# Patient Record
Sex: MALE | Race: WHITE | NOT HISPANIC OR LATINO | Employment: OTHER | URBAN - METROPOLITAN AREA
[De-identification: names, ages, dates, MRNs, and addresses within clinical notes are randomized per-mention and may not be internally consistent; named-entity substitution may affect disease eponyms.]

---

## 2023-04-18 LAB
ALANINE AMINOTRANSFERASE (SGPT) (U/L) IN SER/PLAS: 21 U/L (ref 10–52)
ALBUMIN (G/DL) IN SER/PLAS: 4.4 G/DL (ref 3.4–5)
ALKALINE PHOSPHATASE (U/L) IN SER/PLAS: 111 U/L (ref 33–136)
ANION GAP IN SER/PLAS: 10 MMOL/L (ref 10–20)
ASPARTATE AMINOTRANSFERASE (SGOT) (U/L) IN SER/PLAS: 25 U/L (ref 9–39)
BASOPHILS (10*3/UL) IN BLOOD BY AUTOMATED COUNT: 0.03 X10E9/L (ref 0–0.1)
BASOPHILS/100 LEUKOCYTES IN BLOOD BY AUTOMATED COUNT: 0.5 % (ref 0–2)
BILIRUBIN DIRECT (MG/DL) IN SER/PLAS: 0.2 MG/DL (ref 0–0.3)
BILIRUBIN TOTAL (MG/DL) IN SER/PLAS: 0.9 MG/DL (ref 0–1.2)
CALCIUM (MG/DL) IN SER/PLAS: 9.8 MG/DL (ref 8.6–10.6)
CARBON DIOXIDE, TOTAL (MMOL/L) IN SER/PLAS: 27 MMOL/L (ref 21–32)
CHLORIDE (MMOL/L) IN SER/PLAS: 106 MMOL/L (ref 98–107)
CREATININE (MG/DL) IN SER/PLAS: 1.12 MG/DL (ref 0.5–1.3)
EOSINOPHILS (10*3/UL) IN BLOOD BY AUTOMATED COUNT: 0.11 X10E9/L (ref 0–0.7)
EOSINOPHILS/100 LEUKOCYTES IN BLOOD BY AUTOMATED COUNT: 1.9 % (ref 0–6)
ERYTHROCYTE DISTRIBUTION WIDTH (RATIO) BY AUTOMATED COUNT: 13.3 % (ref 11.5–14.5)
ERYTHROCYTE MEAN CORPUSCULAR HEMOGLOBIN CONCENTRATION (G/DL) BY AUTOMATED: 32.8 G/DL (ref 32–36)
ERYTHROCYTE MEAN CORPUSCULAR VOLUME (FL) BY AUTOMATED COUNT: 93 FL (ref 80–100)
ERYTHROCYTES (10*6/UL) IN BLOOD BY AUTOMATED COUNT: 4.85 X10E12/L (ref 4.5–5.9)
GFR MALE: 73 ML/MIN/1.73M2
GLUCOSE (MG/DL) IN SER/PLAS: 87 MG/DL (ref 74–99)
HEMATOCRIT (%) IN BLOOD BY AUTOMATED COUNT: 45.1 % (ref 41–52)
HEMOGLOBIN (G/DL) IN BLOOD: 14.8 G/DL (ref 13.5–17.5)
IMMATURE GRANULOCYTES/100 LEUKOCYTES IN BLOOD BY AUTOMATED COUNT: 0.2 % (ref 0–0.9)
INR IN PPP BY COAGULATION ASSAY: 1.1 (ref 0.9–1.1)
LEUKOCYTES (10*3/UL) IN BLOOD BY AUTOMATED COUNT: 5.7 X10E9/L (ref 4.4–11.3)
LYMPHOCYTES (10*3/UL) IN BLOOD BY AUTOMATED COUNT: 2.76 X10E9/L (ref 1.2–4.8)
LYMPHOCYTES/100 LEUKOCYTES IN BLOOD BY AUTOMATED COUNT: 48.3 % (ref 13–44)
MONOCYTES (10*3/UL) IN BLOOD BY AUTOMATED COUNT: 0.64 X10E9/L (ref 0.1–1)
MONOCYTES/100 LEUKOCYTES IN BLOOD BY AUTOMATED COUNT: 11.2 % (ref 2–10)
NEUTROPHILS (10*3/UL) IN BLOOD BY AUTOMATED COUNT: 2.17 X10E9/L (ref 1.2–7.7)
NEUTROPHILS/100 LEUKOCYTES IN BLOOD BY AUTOMATED COUNT: 37.9 % (ref 40–80)
NRBC (PER 100 WBCS) BY AUTOMATED COUNT: 0 /100 WBC (ref 0–0)
PLATELETS (10*3/UL) IN BLOOD AUTOMATED COUNT: 130 X10E9/L (ref 150–450)
POTASSIUM (MMOL/L) IN SER/PLAS: 4.9 MMOL/L (ref 3.5–5.3)
PROTEIN TOTAL: 7.7 G/DL (ref 6.4–8.2)
PROTHROMBIN TIME (PT) IN PPP BY COAGULATION ASSAY: 12.2 SEC (ref 9.8–13.4)
SODIUM (MMOL/L) IN SER/PLAS: 138 MMOL/L (ref 136–145)
UREA NITROGEN (MG/DL) IN SER/PLAS: 14 MG/DL (ref 6–23)

## 2024-05-13 PROBLEM — J90 PLEURAL EFFUSION: Status: ACTIVE | Noted: 2022-08-16

## 2024-05-13 PROBLEM — K81.9 CHOLECYSTITIS: Status: ACTIVE | Noted: 2024-05-13

## 2024-05-13 PROBLEM — Z86.0101 H/O ADENOMATOUS POLYP OF COLON: Status: ACTIVE | Noted: 2021-11-19

## 2024-05-13 PROBLEM — R10.9 ABDOMINAL PAIN: Status: ACTIVE | Noted: 2022-08-15

## 2024-05-13 PROBLEM — R10.11 ABDOMINAL PAIN, RIGHT UPPER QUADRANT: Status: ACTIVE | Noted: 2022-08-16

## 2024-05-13 PROBLEM — H26.491 POSTERIOR CAPSULAR OPACIFICATION VISUALLY SIGNIFICANT OF RIGHT EYE: Status: ACTIVE | Noted: 2021-07-22

## 2024-05-13 PROBLEM — R18.8 CIRRHOSIS OF LIVER WITH ASCITES (MULTI): Status: ACTIVE | Noted: 2021-11-19

## 2024-05-13 PROBLEM — R18.8 ABDOMINAL ASCITES: Status: ACTIVE | Noted: 2021-11-18

## 2024-05-13 PROBLEM — K70.30 ALCOHOLIC CIRRHOSIS (MULTI): Status: ACTIVE | Noted: 2022-08-16

## 2024-05-13 PROBLEM — Z86.010 H/O ADENOMATOUS POLYP OF COLON: Status: ACTIVE | Noted: 2021-11-19

## 2024-05-13 PROBLEM — J30.9 ALLERGIC RHINITIS: Status: ACTIVE | Noted: 2024-05-13

## 2024-05-13 PROBLEM — K74.60 CIRRHOSIS OF LIVER WITH ASCITES (MULTI): Status: ACTIVE | Noted: 2021-11-19

## 2024-05-13 PROBLEM — K76.9 LESION OF LIVER: Status: ACTIVE | Noted: 2022-08-16

## 2024-05-13 RX ORDER — OMEPRAZOLE 20 MG/1
CAPSULE, DELAYED RELEASE ORAL
COMMUNITY
Start: 2022-04-29

## 2024-05-13 RX ORDER — FUROSEMIDE 20 MG/1
TABLET ORAL
COMMUNITY
Start: 2022-08-17

## 2024-05-13 RX ORDER — ONDANSETRON 4 MG/1
4 TABLET, ORALLY DISINTEGRATING ORAL
COMMUNITY
Start: 2022-08-17

## 2024-05-13 RX ORDER — KETOROLAC TROMETHAMINE 10 MG/1
1 TABLET, FILM COATED ORAL EVERY 6 HOURS PRN
COMMUNITY
Start: 2022-08-17 | End: 2024-05-14 | Stop reason: WASHOUT

## 2024-05-13 RX ORDER — SPIRONOLACTONE 25 MG/1
25 TABLET ORAL
COMMUNITY
Start: 2022-08-17

## 2024-05-13 RX ORDER — IBUPROFEN 800 MG/1
TABLET ORAL
COMMUNITY
Start: 2022-07-26 | End: 2024-05-14 | Stop reason: WASHOUT

## 2024-05-13 RX ORDER — MULTIVITAMIN
1 TABLET ORAL DAILY
COMMUNITY

## 2024-05-14 ENCOUNTER — OFFICE VISIT (OUTPATIENT)
Dept: GASTROENTEROLOGY | Facility: CLINIC | Age: 64
End: 2024-05-14
Payer: MEDICARE

## 2024-05-14 VITALS
WEIGHT: 154 LBS | HEART RATE: 59 BPM | BODY MASS INDEX: 25.66 KG/M2 | HEIGHT: 65 IN | SYSTOLIC BLOOD PRESSURE: 131 MMHG | OXYGEN SATURATION: 94 % | DIASTOLIC BLOOD PRESSURE: 71 MMHG

## 2024-05-14 DIAGNOSIS — K70.31 ALCOHOLIC CIRRHOSIS OF LIVER WITH ASCITES (MULTI): Primary | ICD-10-CM

## 2024-05-14 DIAGNOSIS — R10.11 ABDOMINAL PAIN, RIGHT UPPER QUADRANT: ICD-10-CM

## 2024-05-14 DIAGNOSIS — K74.60 CIRRHOSIS OF LIVER WITH ASCITES, UNSPECIFIED HEPATIC CIRRHOSIS TYPE (MULTI): ICD-10-CM

## 2024-05-14 DIAGNOSIS — R18.8 CIRRHOSIS OF LIVER WITH ASCITES, UNSPECIFIED HEPATIC CIRRHOSIS TYPE (MULTI): ICD-10-CM

## 2024-05-14 PROCEDURE — 1159F MED LIST DOCD IN RCRD: CPT | Performed by: INTERNAL MEDICINE

## 2024-05-14 PROCEDURE — 1036F TOBACCO NON-USER: CPT | Performed by: INTERNAL MEDICINE

## 2024-05-14 PROCEDURE — 99215 OFFICE O/P EST HI 40 MIN: CPT | Performed by: INTERNAL MEDICINE

## 2024-05-14 PROCEDURE — 1126F AMNT PAIN NOTED NONE PRSNT: CPT | Performed by: INTERNAL MEDICINE

## 2024-05-14 ASSESSMENT — PAIN SCALES - GENERAL: PAINLEVEL: 0-NO PAIN

## 2024-05-14 ASSESSMENT — ENCOUNTER SYMPTOMS
DEPRESSION: 0
OCCASIONAL FEELINGS OF UNSTEADINESS: 0
LOSS OF SENSATION IN FEET: 0

## 2024-05-14 NOTE — PATIENT INSTRUCTIONS
Continue to avoid all alcohol use.    Continue to follow a low-sodium diet.    Get labs in 3 months and in 6 months.    Get an ultrasound in 6 months.    See me back in clinic in about 7 months.    You can stop the water pills.

## 2024-05-14 NOTE — PROGRESS NOTES
HEPATOLOGY RETURN PATIENT VISIT    May 14, 2024    Dr. Wilfred Joiner       Patient Name:   ALINA ZAPIEN  Medical Record Number: [id number]  YOB: 1960    Dear Dr. Joiner,    I had the pleasure of seeing Alina Zapien (along with his wife) for follow-up evaluation in the [facility] ([facility] office). History and physical examination was performed. Pertinent available laboratory, imaging, pathology results were reviewed.  I spent 20 minutes reviewing his chart in preparation for this visit.    History of Present Illness:   The patient is a 63 year old white male who is referred for follow-up evaluation of alcoholic cirrhosis and liver lesions.    The patient is a very long history of alcohol abuse.  He had been drinking 10-15 beers daily for many decades.  He does not previously recall being told of any liver disease.  However, starting in early 2021, he had further evaluation that was concerning for alcoholic liver disease and liver lesions.  For this reason, he was referred to me for further evaluation.    The patient denied any past history of acute hepatitis or jaundice.      He has never had any jaundice, hepatic encephalopathy, or variceal bleeding. He denied ever having a liver biopsy.    He initially saw me in consultation on 9/28/2021.  At that time, I did additional evaluation (see results below).  I also recommended that he avoid absolutely all alcohol and get into a formal alcohol rehab program.  We also had him sign release of information forms from [facility] and [facility] to try and get his outside films.  We never received these.    The patient presented to the [facility] emergency room 11/18/2021 with peripheral edema and increased abdominal girth.  He reported that he had stopped drinking 1 month earlier.  He was found to have ascites and large right pleural effusion.  He was admitted to the hospital.  He underwent a 400 cc right thoracentesis  11/19/2021.  He also underwent a 4.5 L paracentesis on 11/19/2021.  He was apparently told that the pleural effusion was from the liver disease.  There was apparently no concern that it could be a primary pulmonary issue.  He has no known history of tuberculosis exposure or asbestos exposure.    He has been free from alcohol since November 2021.  He also stopped smoking in July 2022.    After discharge from the hospital in November 2021, he was placed on Lasix 40 mg daily and Aldactone 100 mg daily.  He was apparently somewhat lightheaded and dizzy and the doses were decreased.      I just saw him in clinic on 7/19/2022.  At that time, I made further recommendations.  This included decreasing his Lasix and Aldactone to every Monday, Wednesday, and Friday.  I told him to get labs every 3 months and an ultrasound in December 2022.  This included having him come back to see me in 7 months.  However, he presented to [facility] 8/17/2022 with severe right upper quadrant pain.  He was also noted to have elevated LFTs including bilirubin.  They apparently did extensive work-up and had him see a surgeon there who felt that this might be a muscle pull rather than a gallbladder or biliary issue.  They sent him home.  He returned a few hours later with worsening pain and was given some pain medicine and again sent home.  For unclear reasons, they also told him to come see me right away.      He was seen by one of the liver surgeons 9/29/2022 for the question of cholecystectomy and hernia repair.  They went through the risk and benefit of surgery given his advanced cirrhosis.  The patient and his wife opted to hold off on surgery at that time.    He presented today for follow-up evaluation.  He is currently on Lasix 10 mg and Aldactone 25 mg every Monday, Wednesday and Friday.  His ascites has clinically resolved as had his edema.  He does report that he has been gaining weight including fat and muscle weight.  He does  admit that he eats too much sugar.  He remains free from alcohol.  He has had no further episodes of right upper quadrant pain.  He has no shortness of breath.  He feels better.  He is following a low-sodium diet.      Past Medical/Surgical History:   Alcoholic cirrhosis, liver lesions, colon polyps.    Family History:   Mother had breast cancer.  There is no known family history of colon cancer.  There is no known family history of liver disease.  There is no known family history of colon cancer.  His brother has Down syndrome.    Social History:   He has a history of heavy alcohol use.  He drank about 10-15 beers daily.  He has never been in an alcohol rehab program previously.  He stopped drinking on his own in November 2021.  He stopped smoking in July 2022.  He is .  He denied blood transfusions.  He denied tattoos.  He denied intravenous drug use.    Review of systems: As noted above.  He has been eating better and actually gaining some muscle and fat weight.  He does have easy bruising.  He does have weakness.  He does have intermittent constipation and diarrhea and a sense of fullness in the lower abdomen.  He has had some gynecomastia.  He denied any GI bleeding.  No fever, chills, visual changes, auditory changes, shortness of breath, chest pain, abdominal pain, GI bleeding, depression, dysuria, hematuria, musculoskeletal issues, or rash.       Medical, Surgical, Family, and Social Histories  Past Medical History:   Diagnosis Date    Alcoholic cirrhosis of liver without ascites (Multi) 09/30/2022    Alcoholic cirrhosis    Personal history of other diseases of the circulatory system 09/30/2022    History of portal hypertension       Past Surgical History:   Procedure Laterality Date    OTHER SURGICAL HISTORY  09/30/2022    No history of surgery       No family history on file.    Social History     Socioeconomic History    Marital status:      Spouse name: Not on file    Number of children:  "Not on file    Years of education: Not on file    Highest education level: Not on file   Occupational History    Not on file   Tobacco Use    Smoking status: Former     Current packs/day: 0.00     Types: Cigarettes     Quit date:      Years since quittin.3     Passive exposure: Past    Smokeless tobacco: Never   Substance and Sexual Activity    Alcohol use: Not on file    Drug use: Not on file    Sexual activity: Not on file   Other Topics Concern    Not on file   Social History Narrative    Not on file     Social Determinants of Health     Financial Resource Strain: Not on file   Food Insecurity: Not on file   Transportation Needs: Not on file   Physical Activity: Not on file   Stress: Not on file   Social Connections: Not on file   Intimate Partner Violence: Not on file   Housing Stability: Not on file       Allergies and Current Medications  No Known Allergies  Current Outpatient Medications   Medication Sig Dispense Refill    multivitamin tablet Take 1 tablet by mouth once daily.      omeprazole (PriLOSEC) 20 mg DR capsule TAKE ONE CAPSULE BY MOUTH 30 MINUTES BEFORE MORNING MEAL      furosemide (Lasix) 20 mg tablet TAKE 1/2 TABLET BY MOUTH 3 TIMES PER WEEK      ondansetron ODT (Zofran-ODT) 4 mg disintegrating tablet Take 1 tablet (4 mg) by mouth.      spironolactone (Aldactone) 25 mg tablet Take 1 tablet (25 mg) by mouth.       No current facility-administered medications for this visit.        Physical Exam  /71 (BP Location: Left arm, Patient Position: Sitting, BP Cuff Size: Adult long)   Pulse 59   Ht 1.651 m (5' 5\")   Wt 69.9 kg (154 lb)   SpO2 94%   BMI 25.63 kg/m²       Physical Examination:   General Appearance: alert and in no acute distress.   HEENT: oropharynx without lesions. Anicteric sclerae.    Neck supple, nontender, without adenopathy, thyromegaly, or JVD.   Lungs reveal diffuse wheezes and rhonchi and crackles.  Heart RRR without murmurs, rubs, or gallops.   Abdomen: Soft, " nontender, bowel sounds positive, without obvious ascites. Liver is firm and felt about 5 cm below the costal margin and spleen not palpable.  There is no bruit heard over the liver.  He has a very tiny easily reducible umbilical hernia.  He does have increased abdominal girth.  It is difficult to tell if this is adipose tissue or ascites.  Extremities full ROM, no atrophy, normal strength. No edema.   Skin no specific lesions.  He does have multiple ecchymoses.  Neurological exam nonfocal, alert and oriented.  No asterixis.  He does have slight tremor.  He has spider angiomata, as well as bilateral palmar erythema.  Breast: He does have some mild bilateral gynecomastia.  There is no mass.  It is nontender.    Labs 4/29/2024 glucose 122, sodium 143, potassium 4.9, creatinine 1.21, protein 8.5, albumin 4.6, alk phos 75, bilirubin 0.9, AST 38, ALT 40, WBC 5.3, hemoglobin 15.4, platelets 125, INR 1, AFP 5.    Labs 1/18/2023 WBC 5.3, hemoglobin 14.5, platelets 127, AFP 7, hepatitis B surface antibody positive, INR 1, glucose 105, sodium 138, creatinine 1.13, protein 7.4, albumin 4.3, alk phos 149, bilirubin 1.2, AST 22, ALT 22.    Labs 9/21/2022 WBC 5.4, hemoglobin 13, platelets 124, glucose 94, sodium 140, creatinine 0.95, protein 7.2, albumin 3.9, alk phos 156, bilirubin 0.7, AST 22, ALT 22.    Labs 8/17/2022 WBC 7.9, hemoglobin 13.4, platelets 119, glucose 126, sodium 141, potassium 4.7, creatinine 0.85, protein 8.3, albumin 4.5, alk phos 228, bilirubin 3.8, AST 78, ALT 94, lipase 140.    Labs 7/19/2022 WBC 6.8, hemoglobin 13.5, platelets 145, INR 1.1, AFP 5, glucose 90, sodium 140, potassium 4.6, creatinine 1.01, protein 7.7, albumin 4.2, alk phos 148, bilirubin 0.6, AST 20, ALT 20.    Labs 2/8/2022 WBC 7.8, hemoglobin 12.5, platelets 122, glucose 99, sodium 140, potassium 5, creatinine 0.89, protein 7.2, albumin 3.5, alk phos 88, bilirubin 1.1, AST 25, ALT 21, INR 1.3, hemochromatosis genetic test negative, T spot  negative.    Labs 12/28/2021 WBC 7.8, hemoglobin 13.8, platelets 158, INR 1.2, glucose 102, sodium 137, creatinine 0.74, protein 7.5, albumin 3.3, alk phos 102, bilirubin 0.6, AST 53, ALT 32, AFP 14.    Pleural fluid 11/19/2021 albumin less than 1, WBC 1356 with 2% neutrophils, RBCs 2122, culture negative, cytology negative for malignancy.    Ascites 11/19/2021 cytology negative for malignancy, culture negative, albumin < 1, protein < 2,  with 6% PMN's.    Labs 11/18/2021 alcohol undetectable.    Labs 9/28/2021 AFP 13, hepatitis B surface antibody negative, hepatitis A antibody positive, INR 1.3, WBC 8.6, hemoglobin 14.8, platelets 82, ferritin 1300, iron saturation > 80%, alpha-1 antitrypsin phenotype MS, glucose 112, sodium 139, creatinine 0.73, protein 7.8, albumin 3.3, alk phos 204, bilirubin 3.6, , ALT 47.    Labs 4/13/2021 WBC 6.4, hemoglobin 15.4, .2, platelets 125, INR 1.2, glucose 113, sodium 140, creatinine 0.8, protein 7.6, albumin 3.8, alk phos 157, bilirubin 1, AST 88, ALT 41, AFP 9.    Labs 1/21/2021 INR 1.2, ferritin 603, iron panel “within normal limits”, ceruloplasmin “within normal limits”, alpha-1 antitrypsin “within normal limits”, antimitochondrial antibody “within normal limits”, hepatitis B surface antigen negative, hepatitis C antibody negative, smooth muscle antibody 40.    Ultrasound 4/29/2024:  Impression      1. Cirrhotic appearing liver without masses with top normal to minimally enlarged spleen.  2. Cholelithiasis with mild gallbladder wall thickening with nondilated biliary system    Ultrasound 4/24/2023:  Impression   Cirrhotic appearing liver; heterogeneous hyperechoic hepatic  parenchyma reflects some combination of steatosis and/or chronic  hepatocellular disease. There is nothing suspicious for  hepatocellular carcinoma.       Ultrasound 12/12/2022 revealed cirrhosis, no focal liver lesions, small gallstones.    Ultrasound 8/17/2022 revealed cirrhosis,  cholelithiasis with gallbladder sludge, trace ascites.    MRI 6/17/2022:  IMPRESSION:  1. Cirrhosis of the liver with portal hypertension including  minimally dilated recanalized umbilical vein and trace perihepatic  free fluid.  2. No focal hepatic mass lesions.  3. Patent hepatic vasculature.  4. Cholelithiasis without acute cholecystitis.    Barium esophagram 3/18/2022 showed mild/early distal esophageal dysmotility, no evidence of esophageal mass, filling defect, constricting lesion, or other mucosal abnormalities.    MRI 1/18/2022 revealed that multiple sequences were degraded by motion artifact.  There was heterogeneity of the liver parenchyma reflecting hepatocellular disease/fibrosis which limits evaluation as well.  There is an 11 mm arterial enhancing focus in the posterior right hepatic lobe without washout.  Prior areas of hypoenhancement are not definitively seen.  There was mild splenomegaly with mild venous collaterals.  There was trace perihepatic ascites and trace mesenteric edema.    CAT scan with contrast 11/18/2021 revealed extensive ascites and right pleural effusion, possible right atelectasis, inflammatory conditions of the bowel not excluded due to ascites and lack of oral contrast.    Doppler ultrasound of the lower extremity 11/18/2021 revealed no evidence of DVT.    MRI with contrast 5/18/2021 revealed hepatocellular disease/fibrosis with diffuse heterogeneity of the liver parenchyma, 11 mm arterial enhancing focus in the posterior right hepatic lobe without washout, other hypoenhancing lesions without arterial enhancement with possible capsules (LIRADS-3), questionable 4 mm gallbladder polyp, mild splenomegaly, venous collaterals.    Ultrasound 3/31/2021 revealed vague hyperechoic area in the right lobe measuring 1.8 cm.  This was not seen on previous ultrasounds or CAT scans.  There was a 4 mm gallbladder polyp.    EGD 5/8/2024 revealed grade 1 esophageal varices, portal hypertensive  gastropathy, erythema in the antrum, normal duodenum.    Colonoscopy 5/8/2024 revealed a 6 mm cecal polyp, 5 mm ascending colon polyp, a 6 mm transverse colon polyp, two 5-6 millimeter polyps in the rectum.    Pathology from EGD and colonoscopy 5/8/2024:  Final Diagnosis    A.  STOMACH, ANTRUM, BIOPSY:  - REACTIVE GASTROPATHY.     Comment: H&E stain is negative for H. pylori. No significant active inflammation present. Negative for intestinal metaplasia or malignancy.     B.  COLON, CECUM, POLYPECTOMY:  - TUBULAR ADENOMA     C.  COLON, TRANSVERSE, POLYPECTOMY:  - TUBULAR ADENOMA     D.  COLON, DESCENDING, POLYPECTOMY:  - DIMINUTIVE TUBULAR ADENOMA     E.  RECTUM, POLYPECTOMY X 2:  - HYPERPLASTIC POLYPS         Colonoscopy 8/16/2021 revealed a 2.5 cm transverse colon polyp, a 0.8 cm cecal polyp.    Pathology from colonoscopy 8/16/2021 revealed tubular adenomas with no high-grade dysplasia.    EGD 7/30/2021 revealed LA grade A esophagitis, portal hypertensive gastropathy, erythematous duodenal apathy, no varices.    FibroScan 4/23/2021 revealed median liver stiffness 61.6 kPa with IQR 7% and .        Assessment/Plan:     Alcoholic cirrhosis  Liver lesions  Abdominal pain with worsening LFTs and jaundice in August 2022    The patient is referred to me for follow-up evaluation of alcoholic cirrhosis and concern for liver lesions.    I again discussed with the patient about alcoholic cirrhosis.  He needs to continue to avoid absolutely all alcohol use.  He is not interested in formal program and has been doing this on his own.    I again recommended that he stop smoking.    Of concern, is the May 2021 MRI showing liver lesions.  I had him sign a release form so that we can get the actual films to review this at liver tumor board.  Unfortunately, even though we sent those release forms in September 2021, they took considerable amount of time to send the films to us.  In addition, he had a CAT scan of the abdomen in  November 2021.  In addition, he had another MRI in January 2022.       We did eventually review his outside imaging studies at liver radiology tumor board 2/14/2022.  The radiologist did not feel that there were any obvious lesions on the CAT scan or MRI to suggest malignancy.  The radiologist did feel that the MRI was somewhat suboptimal.  The recommendation was to get a repeat MRI in 3 months.  If unrevealing, we would then stretch the interval of imaging out to 6 months.  In fact, his repeat  MRI 6/20/2022 showed no liver lesions but did confirm cirrhosis.  He also had a repeat ultrasound in August 2022 and December 2022 and April 2024 that showed no liver lesions but did again confirm cirrhosis.    I will now just do standard hepatocellular carcinoma screening with AFP and ultrasound every 6 months.    Given the fact that the MRI confirms cirrhosis, we will treat him as a cirrhotic patient.  He does not need to get a liver biopsy.    Right-sided pleural effusion can occur as part of liver disease and cirrhosis.  Obviously, in a smoker, other possibilities would need to be considered.  His T-spot test was negative.  The cytology from the pleural fluid was negative.  His PCP can decide if further evaluation to further look for malignancy/infection is warranted.    He is immune to hepatitis A and hepatitis B.    His ferritin and iron saturation were quite high.  However, his genetic hemochromatosis test was negative.    I will check full liver labs every 3 months.      His ascites and edema have dramatically improved since stopping alcohol.  I did recommend that he continue to follow a low-sodium diet.  He is on trivial doses of diuretics with no reaccumulation of ascites or edema.  I am fine with him stopping these.      He can follow-up with Dr. Hedrick for his GI needs.  His EGD in July 2021 did not show any varices.  His EGD in May 2024 only revealed small grade 1 esophageal varices.  This can be repeated  around May 2025.    He presented to his local hospital in August 2022 with an episode of severe right upper quadrant pain.  In fact, his LFTs including the bilirubin and alkaline phosphatase were worse.  Ultrasound showed gallbladder stones and sludge.  He was reportedly seen by a local surgeon who did not feel that he needed cholecystectomy.  They apparently diagnosed a muscle spasm/pain.  Given the fact that he had the right upper quadrant pain and ultrasound findings of gallstones and sludge and the fact that his liver enzymes worsened, I suspect that he had an episode of biliary colic.  Luckily, the symptoms resolved and his LFTs have significantly improved.  He did see one of the liver surgery providers here at [facility].  They went through the risk and benefit of cholecystectomy and hernia repair.  For now, the patient and his wife have opted not to do surgery.  Obviously, if the symptoms recur, they could be seen by the surgeons again.  If surgery is eventually done, an intraoperative liver biopsy could be performed at the same time.  Luckily, he has had no episodes of recurrent pain since August 2022.    I will have him get labs every 3 months, ultrasound and alpha-fetoprotein every 6 months, and see him back in 7 months.    Thank you for allowing me to participate in the care of this patient. Please feel free to contact me with any questions regarding their care.     Sincerely,     Mehrdad Munguia MD, FAASLD, FACG.   Medical Director, Hepatology  [facility]  [facility]  Professor of Medicine  Division of Gastroenterology and Liver Disease  [employer]  [address] [place] [id number]  Phone: [phone]  Fax: [phone].    Cc: Dr. Savage Hedrick      This document was generated with a computerized dictation system.  Because of this, there could be errors in grammar and/or  content.

## 2024-11-14 ENCOUNTER — HOSPITAL ENCOUNTER (OUTPATIENT)
Dept: RADIOLOGY | Facility: CLINIC | Age: 64
Discharge: HOME | End: 2024-11-14
Payer: MEDICARE

## 2024-11-14 DIAGNOSIS — K74.60 CIRRHOSIS OF LIVER WITH ASCITES, UNSPECIFIED HEPATIC CIRRHOSIS TYPE (MULTI): ICD-10-CM

## 2024-11-14 DIAGNOSIS — R18.8 CIRRHOSIS OF LIVER WITH ASCITES, UNSPECIFIED HEPATIC CIRRHOSIS TYPE (MULTI): ICD-10-CM

## 2024-11-14 PROCEDURE — 76705 ECHO EXAM OF ABDOMEN: CPT

## 2024-11-14 PROCEDURE — 76705 ECHO EXAM OF ABDOMEN: CPT | Performed by: RADIOLOGY

## 2024-12-03 ENCOUNTER — OFFICE VISIT (OUTPATIENT)
Dept: GASTROENTEROLOGY | Facility: CLINIC | Age: 64
End: 2024-12-03
Payer: MEDICARE

## 2024-12-03 VITALS
OXYGEN SATURATION: 96 % | WEIGHT: 153 LBS | BODY MASS INDEX: 25.46 KG/M2 | SYSTOLIC BLOOD PRESSURE: 151 MMHG | DIASTOLIC BLOOD PRESSURE: 90 MMHG | HEART RATE: 63 BPM

## 2024-12-03 DIAGNOSIS — K74.60 CIRRHOSIS OF LIVER WITH ASCITES, UNSPECIFIED HEPATIC CIRRHOSIS TYPE (MULTI): Primary | ICD-10-CM

## 2024-12-03 DIAGNOSIS — K70.31 ALCOHOLIC CIRRHOSIS OF LIVER WITH ASCITES (MULTI): ICD-10-CM

## 2024-12-03 DIAGNOSIS — R18.8 CIRRHOSIS OF LIVER WITH ASCITES, UNSPECIFIED HEPATIC CIRRHOSIS TYPE (MULTI): Primary | ICD-10-CM

## 2024-12-03 PROCEDURE — 1036F TOBACCO NON-USER: CPT | Performed by: INTERNAL MEDICINE

## 2024-12-03 PROCEDURE — 1159F MED LIST DOCD IN RCRD: CPT | Performed by: INTERNAL MEDICINE

## 2024-12-03 PROCEDURE — 1126F AMNT PAIN NOTED NONE PRSNT: CPT | Performed by: INTERNAL MEDICINE

## 2024-12-03 PROCEDURE — 99214 OFFICE O/P EST MOD 30 MIN: CPT | Performed by: INTERNAL MEDICINE

## 2024-12-03 ASSESSMENT — PAIN SCALES - GENERAL: PAINLEVEL_OUTOF10: 0-NO PAIN

## 2024-12-03 ASSESSMENT — ENCOUNTER SYMPTOMS
OCCASIONAL FEELINGS OF UNSTEADINESS: 0
DEPRESSION: 0
LOSS OF SENSATION IN FEET: 0

## 2024-12-03 ASSESSMENT — PATIENT HEALTH QUESTIONNAIRE - PHQ9
2. FEELING DOWN, DEPRESSED OR HOPELESS: NOT AT ALL
SUM OF ALL RESPONSES TO PHQ9 QUESTIONS 1 AND 2: 0
1. LITTLE INTEREST OR PLEASURE IN DOING THINGS: NOT AT ALL

## 2024-12-03 ASSESSMENT — COLUMBIA-SUICIDE SEVERITY RATING SCALE - C-SSRS
6. HAVE YOU EVER DONE ANYTHING, STARTED TO DO ANYTHING, OR PREPARED TO DO ANYTHING TO END YOUR LIFE?: NO
1. IN THE PAST MONTH, HAVE YOU WISHED YOU WERE DEAD OR WISHED YOU COULD GO TO SLEEP AND NOT WAKE UP?: NO
2. HAVE YOU ACTUALLY HAD ANY THOUGHTS OF KILLING YOURSELF?: NO

## 2024-12-03 NOTE — PATIENT INSTRUCTIONS
Continue to follow a low-sodium diet.    Continue to avoid all alcohol use.    Get labs in 3 months and in 6 months.    Get an ultrasound of the liver in 6 months.    Get an upper endoscopy with your local gastroenterologist around May 2025.    See me back in clinic in about 7 months.

## 2025-06-03 ENCOUNTER — APPOINTMENT (OUTPATIENT)
Dept: RADIOLOGY | Facility: CLINIC | Age: 65
End: 2025-06-03
Payer: MEDICARE

## 2025-06-12 ENCOUNTER — HOSPITAL ENCOUNTER (OUTPATIENT)
Dept: RADIOLOGY | Facility: CLINIC | Age: 65
Discharge: HOME | End: 2025-06-12
Payer: MEDICARE

## 2025-06-12 DIAGNOSIS — K70.31 ALCOHOLIC CIRRHOSIS OF LIVER WITH ASCITES (MULTI): ICD-10-CM

## 2025-06-12 PROCEDURE — 76705 ECHO EXAM OF ABDOMEN: CPT

## 2025-06-24 LAB
ALBUMIN SERPL-MCNC: 4.7 G/DL (ref 3.6–5.1)
ALBUMIN/GLOB SERPL: 1.7 (CALC) (ref 1–2.5)
ALP SERPL-CCNC: 58 U/L (ref 35–144)
ALT SERPL-CCNC: 23 U/L (ref 9–46)
ANION GAP SERPL CALCULATED.4IONS-SCNC: 7 MMOL/L (CALC) (ref 7–17)
AST SERPL-CCNC: 21 U/L (ref 10–35)
BASOPHILS # BLD AUTO: 21 CELLS/UL (ref 0–200)
BASOPHILS NFR BLD AUTO: 0.5 %
BILIRUB DIRECT SERPL-MCNC: 0.2 MG/DL
BILIRUB INDIRECT SERPL-MCNC: 0.8 MG/DL (CALC) (ref 0.2–1.2)
BILIRUB SERPL-MCNC: 1 MG/DL (ref 0.2–1.2)
BUN SERPL-MCNC: 13 MG/DL (ref 7–25)
BUN/CREAT SERPL: ABNORMAL (CALC) (ref 6–22)
CALCIUM SERPL-MCNC: 9.4 MG/DL (ref 8.6–10.3)
CHLORIDE SERPL-SCNC: 106 MMOL/L (ref 98–110)
CO2 SERPL-SCNC: 26 MMOL/L (ref 20–32)
CREAT SERPL-MCNC: 1.16 MG/DL (ref 0.7–1.35)
EGFRCR SERPLBLD CKD-EPI 2021: 69 ML/MIN/1.73M2
EOSINOPHIL # BLD AUTO: 90 CELLS/UL (ref 15–500)
EOSINOPHIL NFR BLD AUTO: 2.2 %
ERYTHROCYTE [DISTWIDTH] IN BLOOD BY AUTOMATED COUNT: 13.4 % (ref 11–15)
GLOBULIN SER CALC-MCNC: 2.7 G/DL (CALC) (ref 1.9–3.7)
GLUCOSE SERPL-MCNC: 109 MG/DL (ref 65–99)
HCT VFR BLD AUTO: 45.3 % (ref 38.5–50)
HGB BLD-MCNC: 14.8 G/DL (ref 13.2–17.1)
INR PPP: 1
LYMPHOCYTES # BLD AUTO: 1410 CELLS/UL (ref 850–3900)
LYMPHOCYTES NFR BLD AUTO: 34.4 %
MCH RBC QN AUTO: 31.3 PG (ref 27–33)
MCHC RBC AUTO-ENTMCNC: 32.7 G/DL (ref 32–36)
MCV RBC AUTO: 95.8 FL (ref 80–100)
MONOCYTES # BLD AUTO: 377 CELLS/UL (ref 200–950)
MONOCYTES NFR BLD AUTO: 9.2 %
NEUTROPHILS # BLD AUTO: 2202 CELLS/UL (ref 1500–7800)
NEUTROPHILS NFR BLD AUTO: 53.7 %
PLATELET # BLD AUTO: 133 THOUSAND/UL (ref 140–400)
PMV BLD REES-ECKER: 11.4 FL (ref 7.5–12.5)
POTASSIUM SERPL-SCNC: 5.3 MMOL/L (ref 3.5–5.3)
PROT SERPL-MCNC: 7.4 G/DL (ref 6.1–8.1)
PROTHROMBIN TIME: 10.9 SEC (ref 9–11.5)
RBC # BLD AUTO: 4.73 MILLION/UL (ref 4.2–5.8)
SODIUM SERPL-SCNC: 139 MMOL/L (ref 135–146)
WBC # BLD AUTO: 4.1 THOUSAND/UL (ref 3.8–10.8)

## 2025-07-08 ENCOUNTER — OFFICE VISIT (OUTPATIENT)
Dept: GASTROENTEROLOGY | Facility: CLINIC | Age: 65
End: 2025-07-08
Payer: MEDICARE

## 2025-07-08 VITALS
OXYGEN SATURATION: 96 % | SYSTOLIC BLOOD PRESSURE: 153 MMHG | HEIGHT: 65 IN | BODY MASS INDEX: 24.66 KG/M2 | WEIGHT: 148 LBS | DIASTOLIC BLOOD PRESSURE: 74 MMHG | HEART RATE: 55 BPM

## 2025-07-08 DIAGNOSIS — K74.60 CIRRHOSIS OF LIVER WITH ASCITES, UNSPECIFIED HEPATIC CIRRHOSIS TYPE (MULTI): ICD-10-CM

## 2025-07-08 DIAGNOSIS — K70.31 ALCOHOLIC CIRRHOSIS OF LIVER WITH ASCITES (MULTI): ICD-10-CM

## 2025-07-08 DIAGNOSIS — R18.8 CIRRHOSIS OF LIVER WITH ASCITES, UNSPECIFIED HEPATIC CIRRHOSIS TYPE (MULTI): ICD-10-CM

## 2025-07-08 PROCEDURE — 1036F TOBACCO NON-USER: CPT | Performed by: INTERNAL MEDICINE

## 2025-07-08 PROCEDURE — 1126F AMNT PAIN NOTED NONE PRSNT: CPT | Performed by: INTERNAL MEDICINE

## 2025-07-08 PROCEDURE — 99212 OFFICE O/P EST SF 10 MIN: CPT | Performed by: INTERNAL MEDICINE

## 2025-07-08 PROCEDURE — 1159F MED LIST DOCD IN RCRD: CPT | Performed by: INTERNAL MEDICINE

## 2025-07-08 PROCEDURE — 3008F BODY MASS INDEX DOCD: CPT | Performed by: INTERNAL MEDICINE

## 2025-07-08 PROCEDURE — 99214 OFFICE O/P EST MOD 30 MIN: CPT | Performed by: INTERNAL MEDICINE

## 2025-07-08 ASSESSMENT — ENCOUNTER SYMPTOMS
OCCASIONAL FEELINGS OF UNSTEADINESS: 1
DEPRESSION: 0

## 2025-07-08 ASSESSMENT — PAIN SCALES - GENERAL: PAINLEVEL_OUTOF10: 0-NO PAIN

## 2025-07-08 NOTE — PATIENT INSTRUCTIONS
Continue to get labs every 3 months.    Get an ultrasound in 6 months and in 12 months.    See me back in clinic in about 1 year.

## 2025-07-08 NOTE — PROGRESS NOTES
HEPATOLOGY RETURN PATIENT VISIT    July 8, 2025    Dr. Wilfred Joiner       Patient Name:   ALINA ZAPIEN  Medical Record Number: [id number]  YOB: 1960    Dear Dr. Joiner,    I had the pleasure of seeing Alina Zapien (along with his wife) for follow-up evaluation in the [facility] ([facility] office). History and physical examination was performed. Pertinent available laboratory, imaging, pathology results were reviewed.      History of Present Illness:   The patient is a 64 year old white male who is referred for follow-up evaluation of alcoholic cirrhosis and liver lesions.    The patient is a very long history of alcohol abuse.  He had been drinking 10-15 beers daily for many decades.  He does not previously recall being told of any liver disease.  However, starting in early 2021, he had further evaluation that was concerning for alcoholic liver disease and liver lesions.  For this reason, he was referred to me for further evaluation.    The patient denied any past history of acute hepatitis or jaundice.      He has never had any jaundice, hepatic encephalopathy, or variceal bleeding. He denied ever having a liver biopsy.    He initially saw me in consultation on 9/28/2021.  At that time, I did additional evaluation (see results below).  I also recommended that he avoid absolutely all alcohol and get into a formal alcohol rehab program.  We also had him sign [facility] and [facility] to try and get his outside films.  We never received these.    The patient presented to the [facility] emergency room 11/18/2021 with peripheral edema and increased abdominal girth.  He reported that he had stopped drinking 1 month earlier.  He was found to have ascites and large right pleural effusion.  He was admitted to the hospital.  He underwent a 400 cc right thoracentesis 11/19/2021.  He also underwent a 4.5 L paracentesis on 11/19/2021.  He was  apparently told that the pleural effusion was from the liver disease.  There was apparently no concern that it could be a primary pulmonary issue.  He has no known history of tuberculosis exposure or asbestos exposure.    He has been free from alcohol since November 2021.  He also stopped smoking in July 2022.    After discharge from the hospital in November 2021, he was placed on Lasix 40 mg daily and Aldactone 100 mg daily.  He was apparently somewhat lightheaded and dizzy and the doses were decreased.      I saw him in clinic on 7/19/2022.  At that time, I made further recommendations.  This included decreasing his Lasix and Aldactone to every Monday, Wednesday, and Friday.  I told him to get labs every 3 months and an ultrasound in December 2022.  This included having him come back to see me in 7 months.  However, he presented to [facility]17/2022 with severe right upper quadrant pain.  He was also noted to have elevated LFTs including bilirubin.  They apparently did extensive work-up and had him see a surgeon there who felt that this might be a muscle pull rather than a gallbladder or biliary issue.  They sent him home.  He returned a few hours later with worsening pain and was given some pain medicine and again sent home.  They also told him to come see me right away.      He was seen by one of the liver surgeons 9/29/2022 for the question of cholecystectomy and hernia repair.  They went through the risk and benefit of surgery given his advanced cirrhosis.  The patient and his wife opted to hold off on surgery at that time.    He presented today for follow-up evaluation.  He is now off of diuretics. He has had no further ascites or edema, despite being off of the diuretics.  He weight has remained stable.  He remains free from alcohol.  He has had no further episodes of right upper quadrant pain.  He has no shortness of breath.  He feels better.  He is following a low-sodium diet.      Past  Medical/Surgical History:   Alcoholic cirrhosis, liver lesions, colon polyps.    Family History:   Mother had breast cancer.  There is no known family history of colon cancer.  There is no known family history of liver disease.  There is no known family history of colon cancer.  His brother has Down syndrome.    Social History:   He has a history of heavy alcohol use.  He drank about 10-15 beers daily.  He has never been in an alcohol rehab program previously.  He stopped drinking on his own in November 2021.  He stopped smoking in July 2022.  He is .  He denied blood transfusions.  He denied tattoos.  He denied intravenous drug use.    Review of systems: As noted above.  He has been working out in the yard in the summer heat and has lost a few pounds.  He has had some gynecomastia.  He denied any GI bleeding.  No fever, chills, visual changes, auditory changes, shortness of breath, chest pain, abdominal pain, GI bleeding, constipation, diarrhea, depression, dysuria, hematuria, musculoskeletal issues, or rash.       Medical, Surgical, Family, and Social Histories  Medical History[1]    Surgical History[2]    Family History[3]    Social History     Socioeconomic History    Marital status:      Spouse name: Not on file    Number of children: Not on file    Years of education: Not on file    Highest education level: Not on file   Occupational History    Not on file   Tobacco Use    Smoking status: Former     Current packs/day: 0.00     Types: Cigarettes     Quit date: 2022     Years since quitting: 3.5     Passive exposure: Past    Smokeless tobacco: Never   Substance and Sexual Activity    Alcohol use: Not Currently    Drug use: Never    Sexual activity: Not on file   Other Topics Concern    Not on file   Social History Narrative    Not on file     Social Drivers of Health     Financial Resource Strain: Not on file   Food Insecurity: Not on file   Transportation Needs: Not on file   Physical Activity:  "Not on file   Stress: Not on file   Social Connections: Not on file   Intimate Partner Violence: Not on file   Housing Stability: Not on file       Allergies and Current Medications  Allergies[4]  Current Medications[5]     Physical Exam  /74   Pulse 55   Ht 1.651 m (5' 5\")   Wt 67.1 kg (148 lb)   SpO2 96%   BMI 24.63 kg/m²       Physical Examination:   General Appearance: alert and in no acute distress.   HEENT: oropharynx without lesions. Anicteric sclerae.    Neck supple, nontender, without adenopathy, thyromegaly, or JVD.   Lungs reveal diffuse wheezes and rhonchi and crackles.  Heart RRR without murmurs, rubs, or gallops.   Abdomen: Soft, nontender, bowel sounds positive, without obvious ascites. Liver is firm and felt about 6 cm below the costal margin and spleen not palpable.  He has a very tiny easily reducible umbilical hernia.    Extremities full ROM, no atrophy, normal strength. No edema.  He does have a Dupuytren's contracture on the right hand.  Skin no specific lesions.  He does have multiple ecchymoses.  Neurological exam nonfocal, alert and oriented.  No asterixis.    He has spider angiomata, as well as significant bilateral palmar erythema.    Labs 6/23/2025 INR 1, WBC 4.1, Hgb 14.8, platelets 133, glucose 109, sodium 139, creat 1.16, protein 7.4, albumin 4.7, alk phos 58, bili 1, direct bili 0.2, AST 21, ALT 23.    Labs 11/6/2024 hemoglobin A1c 5.5%, WBC 5.72, hemoglobin 14.6, platelets 125, cholesterol 160, triglycerides 134, sodium 141, AST 29, ALT 30, alk phos 77, bilirubin 0.6, albumin 4.7, creatinine 1.1.    Labs 4/29/2024 glucose 122, sodium 143, potassium 4.9, creatinine 1.21, protein 8.5, albumin 4.6, alk phos 75, bilirubin 0.9, AST 38, ALT 40, WBC 5.3, hemoglobin 15.4, platelets 125, INR 1, AFP 5.    Labs 1/18/2023 WBC 5.3, hemoglobin 14.5, platelets 127, AFP 7, hepatitis B surface antibody positive, INR 1, glucose 105, sodium 138, creatinine 1.13, protein 7.4, albumin 4.3, alk " phos 149, bilirubin 1.2, AST 22, ALT 22.    Labs 9/21/2022 WBC 5.4, hemoglobin 13, platelets 124, glucose 94, sodium 140, creatinine 0.95, protein 7.2, albumin 3.9, alk phos 156, bilirubin 0.7, AST 22, ALT 22.    Labs 8/17/2022 WBC 7.9, hemoglobin 13.4, platelets 119, glucose 126, sodium 141, potassium 4.7, creatinine 0.85, protein 8.3, albumin 4.5, alk phos 228, bilirubin 3.8, AST 78, ALT 94, lipase 140.    Labs 7/19/2022 WBC 6.8, hemoglobin 13.5, platelets 145, INR 1.1, AFP 5, glucose 90, sodium 140, potassium 4.6, creatinine 1.01, protein 7.7, albumin 4.2, alk phos 148, bilirubin 0.6, AST 20, ALT 20.    Labs 2/8/2022 WBC 7.8, hemoglobin 12.5, platelets 122, glucose 99, sodium 140, potassium 5, creatinine 0.89, protein 7.2, albumin 3.5, alk phos 88, bilirubin 1.1, AST 25, ALT 21, INR 1.3, hemochromatosis genetic test negative, T spot negative.    Labs 12/28/2021 WBC 7.8, hemoglobin 13.8, platelets 158, INR 1.2, glucose 102, sodium 137, creatinine 0.74, protein 7.5, albumin 3.3, alk phos 102, bilirubin 0.6, AST 53, ALT 32, AFP 14.    Pleural fluid 11/19/2021 albumin less than 1, WBC 1356 with 2% neutrophils, RBCs 2122, culture negative, cytology negative for malignancy.    Ascites 11/19/2021 cytology negative for malignancy, culture negative, albumin < 1, protein < 2,  with 6% PMN's.    Labs 11/18/2021 alcohol undetectable.    Labs 9/28/2021 AFP 13, hepatitis B surface antibody negative, hepatitis A antibody positive, INR 1.3, WBC 8.6, hemoglobin 14.8, platelets 82, ferritin 1300, iron saturation > 80%, alpha-1 antitrypsin phenotype MS, glucose 112, sodium 139, creatinine 0.73, protein 7.8, albumin 3.3, alk phos 204, bilirubin 3.6, , ALT 47.    Labs 4/13/2021 WBC 6.4, hemoglobin 15.4, .2, platelets 125, INR 1.2, glucose 113, sodium 140, creatinine 0.8, protein 7.6, albumin 3.8, alk phos 157, bilirubin 1, AST 88, ALT 41, AFP 9.    Labs 1/21/2021 INR 1.2, ferritin 603, iron panel “within normal  limits”, ceruloplasmin “within normal limits”, alpha-1 antitrypsin “within normal limits”, antimitochondrial antibody “within normal limits”, hepatitis B surface antigen negative, hepatitis C antibody negative, smooth muscle antibody 40.    US 6/12/2025:  IMPRESSION:  Cirrhotic morphology of the liver. Cholelithiasis.      Ultrasound 11/14/2024:  IMPRESSION:  1. Cirrhotic appearing liver. No focal lesions are noted.  2. Cholelithiasis. No sonographic evidence for cholecystitis.      Ultrasound 4/29/2024:  Impression      1. Cirrhotic appearing liver without masses with top normal to minimally enlarged spleen.  2. Cholelithiasis with mild gallbladder wall thickening with nondilated biliary system    Ultrasound 4/24/2023:  Impression   Cirrhotic appearing liver; heterogeneous hyperechoic hepatic  parenchyma reflects some combination of steatosis and/or chronic  hepatocellular disease. There is nothing suspicious for  hepatocellular carcinoma.       Ultrasound 12/12/2022 revealed cirrhosis, no focal liver lesions, small gallstones.    Ultrasound 8/17/2022 revealed cirrhosis, cholelithiasis with gallbladder sludge, trace ascites.    MRI 6/17/2022:  IMPRESSION:  1. Cirrhosis of the liver with portal hypertension including  minimally dilated recanalized umbilical vein and trace perihepatic  free fluid.  2. No focal hepatic mass lesions.  3. Patent hepatic vasculature.  4. Cholelithiasis without acute cholecystitis.    Barium esophagram 3/18/2022 showed mild/early distal esophageal dysmotility, no evidence of esophageal mass, filling defect, constricting lesion, or other mucosal abnormalities.    MRI 1/18/2022 revealed that multiple sequences were degraded by motion artifact.  There was heterogeneity of the liver parenchyma reflecting hepatocellular disease/fibrosis which limits evaluation as well.  There is an 11 mm arterial enhancing focus in the posterior right hepatic lobe without washout.  Prior areas of  hypoenhancement are not definitively seen.  There was mild splenomegaly with mild venous collaterals.  There was trace perihepatic ascites and trace mesenteric edema.    CAT scan with contrast 11/18/2021 revealed extensive ascites and right pleural effusion, possible right atelectasis, inflammatory conditions of the bowel not excluded due to ascites and lack of oral contrast.    Doppler ultrasound of the lower extremity 11/18/2021 revealed no evidence of DVT.    MRI with contrast 5/18/2021 revealed hepatocellular disease/fibrosis with diffuse heterogeneity of the liver parenchyma, 11 mm arterial enhancing focus in the posterior right hepatic lobe without washout, other hypoenhancing lesions without arterial enhancement with possible capsules (LIRADS-3), questionable 4 mm gallbladder polyp, mild splenomegaly, venous collaterals.    Ultrasound 3/31/2021 revealed vague hyperechoic area in the right lobe measuring 1.8 cm.  This was not seen on previous ultrasounds or CAT scans.  There was a 4 mm gallbladder polyp.    EGD 6/25/2025 revealed normal esophagus, 6 mm polyp in the gastric body, normal duodenum.  Biopsies showed reactive gastritis, hyperplastic polyp, no evidence of H. pylori.    EGD 5/8/2024 revealed grade 1 esophageal varices, portal hypertensive gastropathy, erythema in the antrum, normal duodenum.    Colonoscopy 5/8/2024 revealed a 6 mm cecal polyp, 5 mm ascending colon polyp, a 6 mm transverse colon polyp, two 5-6 millimeter polyps in the rectum.    Pathology from EGD and colonoscopy 5/8/2024:  Final Diagnosis   A. STOMACH, ANTRUM, BIOPSY:  - MILD CHRONIC GASTRITIS AND REACTIVE GASTROPATHY.     B. STOMACH, GREATER CURVATURE OF ANTRUM, POLYPECTOMY:  - POLYPOID FRAGMENT OF GASTRIC MUCOSA (HYPERPLASTIC GASTRIC POLYP WITH  XANTHOMATOUS CHANGES AND CARTILAGE MILD CHRONIC INACTIVE GASTRITIS.      Comment (A-B):  H&E stain is negative for H. Pylori, intestinal metaplasia, dysplasia or malignancy.     C.  GASTROESOPHAGEAL JUNCTION, BIOPSY:  - FRAGMENTS OF UNREMARKABLE JUNCTIONAL MUCOSA.     Comment: Negative for increased eosinophils, active inflammation, fungal organisms or viral cytopathic change.  Negative for intestinal metaplasia, dysplasia or malignancy.        Colonoscopy 8/16/2021 revealed a 2.5 cm transverse colon polyp, a 0.8 cm cecal polyp.    Pathology from colonoscopy 8/16/2021 revealed tubular adenomas with no high-grade dysplasia.    EGD 7/30/2021 revealed LA grade A esophagitis, portal hypertensive gastropathy, erythematous duodenal apathy, no varices.    FibroScan 4/23/2021 revealed median liver stiffness 61.6 kPa with IQR 7% and .        Assessment/Plan:     Alcoholic cirrhosis  Liver lesions  Abdominal pain with worsening LFTs and jaundice in August 2022    The patient is referred to me for follow-up evaluation of alcoholic cirrhosis and concern for liver lesions.    I again discussed with the patient about alcoholic cirrhosis.  He needs to continue to avoid absolutely all alcohol use.  He is not interested in a formal program and has been doing this on his own.    Of concern is the May 2021 MRI showing liver lesions.  I had him sign a release form so that we can get the actual films to review this at liver tumor board.  Unfortunately, even though we sent those release forms in September 2021, they took considerable amount of time to send the films to us.  In addition, he had a CAT scan of the abdomen in November 2021.  In addition, he had another MRI in January 2022.       We did eventually review his outside imaging studies at liver radiology tumor board 2/14/2022.  The radiologist did not feel that there were any obvious lesions on the CAT scan or MRI to suggest malignancy.  The radiologist did feel that the MRI was somewhat suboptimal.  The recommendation was to get a repeat MRI in 3 months.  If unrevealing, we would then stretch the interval of imaging out to 6 months.  In fact, his  repeat  MRI 6/20/2022 showed no liver lesions but did confirm cirrhosis.  He has continued to have multiple ultrasounds every 6 months which have again showed no liver lesions but have confirmed cirrhosis.  We will just continue with standard hepatocellular carcinoma screening with AFP and ultrasound every 6 months.    Given the fact that the MRI confirmed cirrhosis, we will treat him as a cirrhotic patient.  He does not need to get a liver biopsy.    Right-sided pleural effusion can occur as part of liver disease and cirrhosis.  Obviously, in a smoker, other possibilities would need to be considered.  His T-spot test was negative.  The cytology from the pleural fluid was negative.      He is immune to hepatitis A and hepatitis B.    His ferritin and iron saturation were quite high.  However, his genetic hemochromatosis test was negative.    I will check full liver labs every 3 months.      I did recommend that he continue to follow a low-sodium diet.  He has had no recurrence of ascites or edema, despite stopping the diuretics.    He can follow-up with Dr. Hedrick for his GI needs.  His EGD in July 2021 did not show any varices.  His EGD in May 2024 only revealed small grade 1 esophageal varices.  The repeat EGD in June 2025 again showed no varices.  This can be repeated around June 2027 with his local gastroenterologist.  He will apparently be due for a simultaneous colonoscopy.    He presented to his local hospital in August 2022 with an episode of severe right upper quadrant pain.  In fact, his LFTs including the bilirubin and alkaline phosphatase were worse.  Ultrasound showed gallbladder stones and sludge.  He was reportedly seen by a local surgeon who did not feel that he needed cholecystectomy.  They apparently diagnosed a muscle spasm/pain.  Given the fact that he had the right upper quadrant pain and ultrasound findings of gallstones and sludge and the fact that his liver enzymes worsened, I suspect  that he had an episode of biliary colic.  Luckily, the symptoms resolved and his LFTs have significantly improved.  He did see one of the liver surgery providers here at [facility].  They went through the risk and benefit of cholecystectomy and hernia repair.  For now, the patient and his wife have opted not to do surgery.  Obviously, if the symptoms recur, they could be seen by the surgeons again.  If surgery is eventually done, an intraoperative liver biopsy could be performed at the same time.  Luckily, he has had no episodes of recurrent pain since August 2022.    I will have him get labs every 3 months, ultrasound and alpha-fetoprotein every 6 months, and see him back in about 1 year.  I will recheck an iron panel, ferritin and PETH in about 3 months.    Thank you for allowing me to participate in the care of this patient. Please feel free to contact me with any questions regarding their care.     Sincerely,     Mehrdad Munguia MD, ARUN, FAASLD, FACG.   Medical Director, Hepatology  [facility]  [facility]  Professor of Medicine  Division of Gastroenterology and Liver Disease  [employer]  [address] [place], [place] [address]  Phone: [phone]  Fax: [phone].    Cc: Dr. Savage Hedrick      This document was generated with a computerized dictation system.  Because of this, there could be errors in grammar and/or content.           [1]   Past Medical History:  Diagnosis Date    Alcoholic cirrhosis of liver without ascites (Multi) 09/30/2022    Alcoholic cirrhosis    Personal history of other diseases of the circulatory system 09/30/2022    History of portal hypertension   [2]   Past Surgical History:  Procedure Laterality Date    OTHER SURGICAL HISTORY  09/30/2022    No history of surgery   [3] No family history on file.  [4] No Known Allergies  [5]   Current Outpatient Medications   Medication  Sig Dispense Refill    multivitamin tablet Take 1 tablet by mouth once daily.      omeprazole (PriLOSEC) 20 mg DR capsule        No current facility-administered medications for this visit.